# Patient Record
Sex: MALE | Race: WHITE | NOT HISPANIC OR LATINO | URBAN - METROPOLITAN AREA
[De-identification: names, ages, dates, MRNs, and addresses within clinical notes are randomized per-mention and may not be internally consistent; named-entity substitution may affect disease eponyms.]

---

## 2018-03-20 ENCOUNTER — INPATIENT (INPATIENT)
Facility: HOSPITAL | Age: 68
LOS: 0 days | Discharge: ANOTHER HOSPITAL | End: 2018-03-21
Attending: INTERNAL MEDICINE | Admitting: INTERNAL MEDICINE
Payer: MEDICARE

## 2018-03-20 VITALS — OXYGEN SATURATION: 92 % | HEIGHT: 69 IN | WEIGHT: 169.98 LBS

## 2018-03-20 DIAGNOSIS — I25.110 ATHEROSCLEROTIC HEART DISEASE OF NATIVE CORONARY ARTERY WITH UNSTABLE ANGINA PECTORIS: ICD-10-CM

## 2018-03-20 DIAGNOSIS — Z87.891 PERSONAL HISTORY OF NICOTINE DEPENDENCE: ICD-10-CM

## 2018-03-20 DIAGNOSIS — R07.9 CHEST PAIN, UNSPECIFIED: ICD-10-CM

## 2018-03-20 DIAGNOSIS — I11.0 HYPERTENSIVE HEART DISEASE WITH HEART FAILURE: ICD-10-CM

## 2018-03-20 DIAGNOSIS — I50.21 ACUTE SYSTOLIC (CONGESTIVE) HEART FAILURE: ICD-10-CM

## 2018-03-20 DIAGNOSIS — G47.33 OBSTRUCTIVE SLEEP APNEA (ADULT) (PEDIATRIC): ICD-10-CM

## 2018-03-20 DIAGNOSIS — I27.20 PULMONARY HYPERTENSION, UNSPECIFIED: ICD-10-CM

## 2018-03-20 DIAGNOSIS — I34.0 NONRHEUMATIC MITRAL (VALVE) INSUFFICIENCY: ICD-10-CM

## 2018-03-20 LAB
ADD ON TEST-SPECIMEN IN LAB: SIGNIFICANT CHANGE UP
ALBUMIN SERPL ELPH-MCNC: 4 G/DL — SIGNIFICANT CHANGE UP (ref 3.3–5)
ALP SERPL-CCNC: 63 U/L — SIGNIFICANT CHANGE UP (ref 40–120)
ALT FLD-CCNC: 24 U/L — SIGNIFICANT CHANGE UP (ref 12–78)
ANION GAP SERPL CALC-SCNC: 9 MMOL/L — SIGNIFICANT CHANGE UP (ref 5–17)
APPEARANCE UR: CLEAR — SIGNIFICANT CHANGE UP
APTT BLD: 29.4 SEC — SIGNIFICANT CHANGE UP (ref 27.5–37.4)
AST SERPL-CCNC: 21 U/L — SIGNIFICANT CHANGE UP (ref 15–37)
BASOPHILS # BLD AUTO: 0.07 K/UL — SIGNIFICANT CHANGE UP (ref 0–0.2)
BASOPHILS NFR BLD AUTO: 0.9 % — SIGNIFICANT CHANGE UP (ref 0–2)
BILIRUB SERPL-MCNC: 1 MG/DL — SIGNIFICANT CHANGE UP (ref 0.2–1.2)
BILIRUB UR-MCNC: NEGATIVE — SIGNIFICANT CHANGE UP
BUN SERPL-MCNC: 15 MG/DL — SIGNIFICANT CHANGE UP (ref 7–23)
CALCIUM SERPL-MCNC: 8.4 MG/DL — LOW (ref 8.5–10.1)
CHLORIDE SERPL-SCNC: 106 MMOL/L — SIGNIFICANT CHANGE UP (ref 96–108)
CO2 SERPL-SCNC: 24 MMOL/L — SIGNIFICANT CHANGE UP (ref 22–31)
COLOR SPEC: YELLOW — SIGNIFICANT CHANGE UP
CREAT SERPL-MCNC: 1.2 MG/DL — SIGNIFICANT CHANGE UP (ref 0.5–1.3)
D DIMER BLD IA.RAPID-MCNC: 343 NG/ML DDU — HIGH
DIFF PNL FLD: NEGATIVE — SIGNIFICANT CHANGE UP
EOSINOPHIL # BLD AUTO: 0.04 K/UL — SIGNIFICANT CHANGE UP (ref 0–0.5)
EOSINOPHIL NFR BLD AUTO: 0.5 % — SIGNIFICANT CHANGE UP (ref 0–6)
GLUCOSE SERPL-MCNC: 106 MG/DL — HIGH (ref 70–99)
GLUCOSE UR QL: NEGATIVE MG/DL — SIGNIFICANT CHANGE UP
HCT VFR BLD CALC: 46.9 % — SIGNIFICANT CHANGE UP (ref 39–50)
HGB BLD-MCNC: 15.3 G/DL — SIGNIFICANT CHANGE UP (ref 13–17)
IMM GRANULOCYTES NFR BLD AUTO: 0.6 % — SIGNIFICANT CHANGE UP (ref 0–1.5)
INR BLD: 1 RATIO — SIGNIFICANT CHANGE UP (ref 0.88–1.16)
KETONES UR-MCNC: NEGATIVE — SIGNIFICANT CHANGE UP
LACTATE SERPL-SCNC: 1.6 MMOL/L — SIGNIFICANT CHANGE UP (ref 0.7–2)
LEUKOCYTE ESTERASE UR-ACNC: NEGATIVE — SIGNIFICANT CHANGE UP
LYMPHOCYTES # BLD AUTO: 1.68 K/UL — SIGNIFICANT CHANGE UP (ref 1–3.3)
LYMPHOCYTES # BLD AUTO: 20.6 % — SIGNIFICANT CHANGE UP (ref 13–44)
MCHC RBC-ENTMCNC: 31.4 PG — SIGNIFICANT CHANGE UP (ref 27–34)
MCHC RBC-ENTMCNC: 32.6 GM/DL — SIGNIFICANT CHANGE UP (ref 32–36)
MCV RBC AUTO: 96.1 FL — SIGNIFICANT CHANGE UP (ref 80–100)
MONOCYTES # BLD AUTO: 0.93 K/UL — HIGH (ref 0–0.9)
MONOCYTES NFR BLD AUTO: 11.4 % — SIGNIFICANT CHANGE UP (ref 2–14)
NEUTROPHILS # BLD AUTO: 5.38 K/UL — SIGNIFICANT CHANGE UP (ref 1.8–7.4)
NEUTROPHILS NFR BLD AUTO: 66 % — SIGNIFICANT CHANGE UP (ref 43–77)
NITRITE UR-MCNC: NEGATIVE — SIGNIFICANT CHANGE UP
NRBC # BLD: 0 /100 WBCS — SIGNIFICANT CHANGE UP (ref 0–0)
NT-PROBNP SERPL-SCNC: 1665 PG/ML — HIGH (ref 0–125)
PH UR: 6 — SIGNIFICANT CHANGE UP (ref 5–8)
PLATELET # BLD AUTO: 173 K/UL — SIGNIFICANT CHANGE UP (ref 150–400)
POTASSIUM SERPL-MCNC: 4 MMOL/L — SIGNIFICANT CHANGE UP (ref 3.5–5.3)
POTASSIUM SERPL-SCNC: 4 MMOL/L — SIGNIFICANT CHANGE UP (ref 3.5–5.3)
PROT SERPL-MCNC: 6.9 GM/DL — SIGNIFICANT CHANGE UP (ref 6–8.3)
PROT UR-MCNC: NEGATIVE MG/DL — SIGNIFICANT CHANGE UP
PROTHROM AB SERPL-ACNC: 10.8 SEC — SIGNIFICANT CHANGE UP (ref 9.8–12.7)
RAPID RVP RESULT: SIGNIFICANT CHANGE UP
RBC # BLD: 4.88 M/UL — SIGNIFICANT CHANGE UP (ref 4.2–5.8)
RBC # FLD: 12.9 % — SIGNIFICANT CHANGE UP (ref 10.3–14.5)
SODIUM SERPL-SCNC: 139 MMOL/L — SIGNIFICANT CHANGE UP (ref 135–145)
SP GR SPEC: 1.01 — SIGNIFICANT CHANGE UP (ref 1.01–1.02)
TROPONIN I SERPL-MCNC: <0.015 NG/ML — SIGNIFICANT CHANGE UP (ref 0.01–0.04)
TROPONIN I SERPL-MCNC: <0.015 NG/ML — SIGNIFICANT CHANGE UP (ref 0.01–0.04)
UROBILINOGEN FLD QL: NEGATIVE MG/DL — SIGNIFICANT CHANGE UP
WBC # BLD: 8.15 K/UL — SIGNIFICANT CHANGE UP (ref 3.8–10.5)
WBC # FLD AUTO: 8.15 K/UL — SIGNIFICANT CHANGE UP (ref 3.8–10.5)

## 2018-03-20 PROCEDURE — 99285 EMERGENCY DEPT VISIT HI MDM: CPT

## 2018-03-20 PROCEDURE — 93010 ELECTROCARDIOGRAM REPORT: CPT

## 2018-03-20 PROCEDURE — 71045 X-RAY EXAM CHEST 1 VIEW: CPT | Mod: 26

## 2018-03-20 RX ORDER — TIOTROPIUM BROMIDE 18 UG/1
1 CAPSULE ORAL; RESPIRATORY (INHALATION) DAILY
Qty: 0 | Refills: 0 | Status: DISCONTINUED | OUTPATIENT
Start: 2018-03-20 | End: 2018-03-20

## 2018-03-20 RX ORDER — IPRATROPIUM/ALBUTEROL SULFATE 18-103MCG
3 AEROSOL WITH ADAPTER (GRAM) INHALATION
Qty: 0 | Refills: 0 | Status: DISCONTINUED | OUTPATIENT
Start: 2018-03-20 | End: 2018-03-21

## 2018-03-20 RX ORDER — ACETAMINOPHEN 500 MG
650 TABLET ORAL EVERY 6 HOURS
Qty: 0 | Refills: 0 | Status: DISCONTINUED | OUTPATIENT
Start: 2018-03-20 | End: 2018-03-21

## 2018-03-20 RX ORDER — FUROSEMIDE 40 MG
20 TABLET ORAL ONCE
Qty: 0 | Refills: 0 | Status: COMPLETED | OUTPATIENT
Start: 2018-03-20 | End: 2018-03-20

## 2018-03-20 RX ORDER — ATORVASTATIN CALCIUM 80 MG/1
40 TABLET, FILM COATED ORAL AT BEDTIME
Qty: 0 | Refills: 0 | Status: DISCONTINUED | OUTPATIENT
Start: 2018-03-20 | End: 2018-03-21

## 2018-03-20 RX ORDER — ALBUTEROL 90 UG/1
1 AEROSOL, METERED ORAL EVERY 4 HOURS
Qty: 0 | Refills: 0 | Status: DISCONTINUED | OUTPATIENT
Start: 2018-03-20 | End: 2018-03-20

## 2018-03-20 RX ORDER — ALBUTEROL 90 UG/1
2.5 AEROSOL, METERED ORAL EVERY 4 HOURS
Qty: 0 | Refills: 0 | Status: DISCONTINUED | OUTPATIENT
Start: 2018-03-20 | End: 2018-03-21

## 2018-03-20 RX ORDER — ONDANSETRON 8 MG/1
4 TABLET, FILM COATED ORAL EVERY 6 HOURS
Qty: 0 | Refills: 0 | Status: DISCONTINUED | OUTPATIENT
Start: 2018-03-20 | End: 2018-03-21

## 2018-03-20 RX ORDER — IPRATROPIUM/ALBUTEROL SULFATE 18-103MCG
3 AEROSOL WITH ADAPTER (GRAM) INHALATION EVERY 6 HOURS
Qty: 0 | Refills: 0 | Status: DISCONTINUED | OUTPATIENT
Start: 2018-03-20 | End: 2018-03-20

## 2018-03-20 RX ORDER — ASPIRIN/CALCIUM CARB/MAGNESIUM 324 MG
1 TABLET ORAL
Qty: 0 | Refills: 0 | COMMUNITY

## 2018-03-20 RX ORDER — ASPIRIN/CALCIUM CARB/MAGNESIUM 324 MG
325 TABLET ORAL DAILY
Qty: 0 | Refills: 0 | Status: DISCONTINUED | OUTPATIENT
Start: 2018-03-20 | End: 2018-03-21

## 2018-03-20 RX ADMIN — Medication 3 MILLILITER(S): at 10:27

## 2018-03-20 RX ADMIN — Medication 20 MILLIGRAM(S): at 13:58

## 2018-03-20 RX ADMIN — Medication 10 MILLIGRAM(S): at 13:58

## 2018-03-20 RX ADMIN — Medication 325 MILLIGRAM(S): at 13:57

## 2018-03-20 RX ADMIN — ATORVASTATIN CALCIUM 40 MILLIGRAM(S): 80 TABLET, FILM COATED ORAL at 22:53

## 2018-03-20 RX ADMIN — Medication 125 MILLIGRAM(S): at 10:53

## 2018-03-20 NOTE — ED ADULT TRIAGE NOTE - CHIEF COMPLAINT QUOTE
Pt complains of difficulty breathing, "I feel like I can't catch my breath," starting yesterday morning, worsening today.  Pulse ox 92% on room air on arrival.

## 2018-03-20 NOTE — H&P ADULT - ASSESSMENT
#Dyspnea on exertion  Admit to tele  CEx3  Card evfreddy Puga  Last ECHO as per DR Puga- EF 44%  ?New onset CHF- will give Lasix 20mg IV one dose as pt is diuretic-naive  Aspirin, Lipitor  NPO p MN for card cath in am  Pulm verna Cisneros- as per pt had recent PFT's    #Dispo- inpatient admit

## 2018-03-20 NOTE — H&P ADULT - HISTORY OF PRESENT ILLNESS
69yo/M with PMH HTN, hyperlipidemia took himself off all the meds while started on "plant diet" presented for evaluation of SOB. He states that for the last couple of days he was getting worsening dyspnea on exertion and today in am he woke up as he could not breathe. Denies chest pain. NO fever, no recent sick contacts.   Patient had positive stress test 2 years ago during clearance for cystoscopy, was essentially asymptomatic and did not have card cath. HIs last ECHO had EF 44%

## 2018-03-20 NOTE — H&P ADULT - NSHPPHYSICALEXAM_GEN_ALL_CORE
Vital Signs Last 24 Hrs  T(C): 37 (20 Mar 2018 12:53), Max: 37 (20 Mar 2018 12:53)  T(F): 98.6 (20 Mar 2018 12:53), Max: 98.6 (20 Mar 2018 12:53)  HR: 114 (20 Mar 2018 12:53) (88 - 119)  BP: 123/83 (20 Mar 2018 12:53) (123/83 - 152/99)  BP(mean): --  RR: 17 (20 Mar 2018 12:53) (16 - 19)  SpO2: 95% (20 Mar 2018 12:53) (92% - 98%)

## 2018-03-20 NOTE — H&P ADULT - NSHPLABSRESULTS_GEN_ALL_CORE
15.3   8.15  )-----------( 173      ( 20 Mar 2018 08:16 )             46.9     20 Mar 2018 08:16    139    |  106    |  15     ----------------------------<  106    4.0     |  24     |  1.20     Ca    8.4        20 Mar 2018 08:16    TPro  6.9    /  Alb  4.0    /  TBili  1.0    /  DBili  x      /  AST  21     /  ALT  24     /  AlkPhos  63     20 Mar 2018 08:16    LIVER FUNCTIONS - ( 20 Mar 2018 08:16 )  Alb: 4.0 g/dL / Pro: 6.9 gm/dL / ALK PHOS: 63 U/L / ALT: 24 U/L / AST: 21 U/L / GGT: x         PT/INR - ( 20 Mar 2018 08:16 )   PT: 10.8 sec;   INR: 1.00 ratio       PTT - ( 20 Mar 2018 08:16 )  PTT:29.4 sec    CARDIAC MARKERS ( 20 Mar 2018 10:54 )  <0.015 ng/mL / x     / x     / x     / x      CARDIAC MARKERS ( 20 Mar 2018 08:16 )  <0.015 ng/mL / x     / x     / x     / x        Urinalysis Basic - ( 20 Mar 2018 08:16 )  Color: Yellow / Appearance: Clear / S.015 / pH: x  Gluc: x / Ketone: Negative  / Bili: Negative / Urobili: Negative mg/dL   Blood: x / Protein: Negative mg/dL / Nitrite: Negative   Leuk Esterase: Negative / RBC: x / WBC x   Sq Epi: x / Non Sq Epi: x / Bacteria: x

## 2018-03-20 NOTE — ED PROVIDER NOTE - OBJECTIVE STATEMENT
67yo M with hx of htn, hyperlipidemia, BPH presents with sob the last few days.  Pt has been busy moving with his wife.  He denies any cp, pleuritic cp, fevers, uri sx, myalgias.  No risk factors for PE.  Pt has stopped all his meds due to side effects.  His pcp is not aware.    PCP Ad[place].

## 2018-03-20 NOTE — ED PROVIDER NOTE - PROGRESS NOTE DETAILS
Spoke to hospitalist who recommends ddimer.  Ddimer slightly elevated.  Very low suspicion for PE and less than 700 for age adjusted ddimer.  Dr. Cha agrees with holding off on further imaging at this time.  She will consult cards.

## 2018-03-20 NOTE — PROVIDER CONTACT NOTE (OTHER) - SITUATION
pt received at 12:00. Labs reviewed, elevated D-dimer noted. MD Chappell made aware, MD Cha also made aware. Awaiting orders at this time. Will continue to monitor.

## 2018-03-20 NOTE — ED ADULT NURSE NOTE - OBJECTIVE STATEMENT
Pt with sob upon exertion and waking up in a.m., onset "couple days ago".  Pt also c/o dry cough last week.  Denies fevers/chills

## 2018-03-21 ENCOUNTER — TRANSCRIPTION ENCOUNTER (OUTPATIENT)
Age: 68
End: 2018-03-21

## 2018-03-21 VITALS
OXYGEN SATURATION: 100 % | DIASTOLIC BLOOD PRESSURE: 74 MMHG | RESPIRATION RATE: 16 BRPM | SYSTOLIC BLOOD PRESSURE: 105 MMHG | HEART RATE: 96 BPM | TEMPERATURE: 97 F

## 2018-03-21 LAB
ANION GAP SERPL CALC-SCNC: 9 MMOL/L — SIGNIFICANT CHANGE UP (ref 5–17)
BUN SERPL-MCNC: 15 MG/DL — SIGNIFICANT CHANGE UP (ref 7–23)
CALCIUM SERPL-MCNC: 8.2 MG/DL — LOW (ref 8.5–10.1)
CHLORIDE SERPL-SCNC: 106 MMOL/L — SIGNIFICANT CHANGE UP (ref 96–108)
CO2 SERPL-SCNC: 22 MMOL/L — SIGNIFICANT CHANGE UP (ref 22–31)
CREAT SERPL-MCNC: 0.94 MG/DL — SIGNIFICANT CHANGE UP (ref 0.5–1.3)
CULTURE RESULTS: NO GROWTH — SIGNIFICANT CHANGE UP
GLUCOSE SERPL-MCNC: 110 MG/DL — HIGH (ref 70–99)
HCT VFR BLD CALC: 42.5 % — SIGNIFICANT CHANGE UP (ref 39–50)
HGB BLD-MCNC: 14.1 G/DL — SIGNIFICANT CHANGE UP (ref 13–17)
MCHC RBC-ENTMCNC: 31 PG — SIGNIFICANT CHANGE UP (ref 27–34)
MCHC RBC-ENTMCNC: 33.2 GM/DL — SIGNIFICANT CHANGE UP (ref 32–36)
MCV RBC AUTO: 93.4 FL — SIGNIFICANT CHANGE UP (ref 80–100)
NRBC # BLD: 0 /100 WBCS — SIGNIFICANT CHANGE UP (ref 0–0)
PLATELET # BLD AUTO: 184 K/UL — SIGNIFICANT CHANGE UP (ref 150–400)
POTASSIUM SERPL-MCNC: 4.2 MMOL/L — SIGNIFICANT CHANGE UP (ref 3.5–5.3)
POTASSIUM SERPL-SCNC: 4.2 MMOL/L — SIGNIFICANT CHANGE UP (ref 3.5–5.3)
RBC # BLD: 4.55 M/UL — SIGNIFICANT CHANGE UP (ref 4.2–5.8)
RBC # FLD: 13 % — SIGNIFICANT CHANGE UP (ref 10.3–14.5)
SODIUM SERPL-SCNC: 137 MMOL/L — SIGNIFICANT CHANGE UP (ref 135–145)
SPECIMEN SOURCE: SIGNIFICANT CHANGE UP
WBC # BLD: 12.26 K/UL — HIGH (ref 3.8–10.5)
WBC # FLD AUTO: 12.26 K/UL — HIGH (ref 3.8–10.5)

## 2018-03-21 PROCEDURE — 93312 ECHO TRANSESOPHAGEAL: CPT | Mod: 26

## 2018-03-21 PROCEDURE — 93306 TTE W/DOPPLER COMPLETE: CPT | Mod: 26

## 2018-03-21 RX ORDER — ALBUTEROL 90 UG/1
0 AEROSOL, METERED ORAL
Qty: 0 | Refills: 0 | COMMUNITY
Start: 2018-03-21

## 2018-03-21 RX ORDER — ATORVASTATIN CALCIUM 80 MG/1
1 TABLET, FILM COATED ORAL
Qty: 0 | Refills: 0 | COMMUNITY
Start: 2018-03-21

## 2018-03-21 RX ORDER — ATORVASTATIN CALCIUM 80 MG/1
1 TABLET, FILM COATED ORAL
Qty: 0 | Refills: 0 | COMMUNITY

## 2018-03-21 RX ORDER — IPRATROPIUM/ALBUTEROL SULFATE 18-103MCG
3 AEROSOL WITH ADAPTER (GRAM) INHALATION
Qty: 0 | Refills: 0 | COMMUNITY
Start: 2018-03-21

## 2018-03-21 RX ORDER — ACETAMINOPHEN 500 MG
2 TABLET ORAL
Qty: 0 | Refills: 0 | COMMUNITY
Start: 2018-03-21

## 2018-03-21 RX ADMIN — Medication 10 MILLIGRAM(S): at 05:24

## 2018-03-21 RX ADMIN — Medication 325 MILLIGRAM(S): at 08:40

## 2018-03-21 NOTE — PROGRESS NOTE ADULT - SUBJECTIVE AND OBJECTIVE BOX
69yo/M with PMH HTN, hyperlipidemia took himself off all the meds while started on "plant diet" presented for evaluation of SOB. He states that for the last couple of days he was getting worsening dyspnea on exertion and today in am he woke up as he could not breathe. Denies chest pain. NO fever, no recent sick contacts.   Patient had positive stress test 2 years ago during clearance for cystoscopy, was essentially asymptomatic and did not have card cath. HIs last ECHO had EF 44%        18: Patient seen and examined. He denies any sob now. cardiac cath today. Discussed with patient and wife at bed side regarding management and d/c plan. Discussed with Dr Puga.           Vital Signs Last 24 Hrs  T(C): 36.6 (21 Mar 2018 05:20), Max: 37 (20 Mar 2018 12:53)  T(F): 97.8 (21 Mar 2018 05:20), Max: 98.6 (20 Mar 2018 12:53)  HR: 98 (21 Mar 2018 09:41) (96 - 117)  BP: 121/76 (21 Mar 2018 09:41) (121/76 - 171/76)  BP(mean): --  RR: 16 (21 Mar 2018 09:41) (16 - 19)  SpO2: 98% (21 Mar 2018 09:41) (95% - 98%)      Physical Exam:  · Constitutional	Well-developed, well nourished	  · Neck	No bruits; no thyromegaly or nodules	  · Back	No deformity or limitation of movement	  · Respiratory	detailed exam	  · Respiratory Details	rales	  · Cardiovascular	Regular rate & rhythm, normal S1, S2; no murmurs, gallops or rubs; no S3, S4	  · Gastrointestinal	Soft, non-tender, no hepatosplenomegaly, normal bowel sounds	  · Extremities	No cyanosis, clubbing or edema	  · Neurological	Alert & oriented; no sensory, motor or coordination deficits, normal reflexes	  · Skin	No lesions; no rash	  · Musculoskeletal	No joint pain, swelling or deformity; no limitation of movement	          Labs:                              14.1   12.26 )-----------( 184      ( 21 Mar 2018 05:29 )             42.5     21 Mar 2018 05:29    137    |  106    |  15     ----------------------------<  110    4.2     |  22     |  0.94     Ca    8.2        21 Mar 2018 05:29    TPro  6.9    /  Alb  4.0    /  TBili  1.0    /  DBili  x      /  AST  21     /  ALT  24     /  AlkPhos  63     20 Mar 2018 08:16    LIVER FUNCTIONS - ( 20 Mar 2018 08:16 )  Alb: 4.0 g/dL / Pro: 6.9 gm/dL / ALK PHOS: 63 U/L / ALT: 24 U/L / AST: 21 U/L / GGT: x           PT/INR - ( 20 Mar 2018 08:16 )   PT: 10.8 sec;   INR: 1.00 ratio         PTT - ( 20 Mar 2018 08:16 )  PTT:29.4 sec  CAPILLARY BLOOD GLUCOSE        CARDIAC MARKERS ( 20 Mar 2018 10:54 )  <0.015 ng/mL / x     / x     / x     / x      CARDIAC MARKERS ( 20 Mar 2018 08:16 )  <0.015 ng/mL / x     / x     / x     / x          Urinalysis Basic - ( 20 Mar 2018 08:16 )    Color: Yellow / Appearance: Clear / S.015 / pH: x  Gluc: x / Ketone: Negative  / Bili: Negative / Urobili: Negative mg/dL   Blood: x / Protein: Negative mg/dL / Nitrite: Negative   Leuk Esterase: Negative / RBC: x / WBC x   Sq Epi: x / Non Sq Epi: x / Bacteria: x            MEDICATIONS:    ALBUTerol/ipratropium for Nebulization 3 milliLiter(s) Nebulizer every 15 minutes  aspirin 325 milliGRAM(s) Oral daily  atorvastatin 40 milliGRAM(s) Oral at bedtime  enalapril 10 milliGRAM(s) Oral daily    MEDICATIONS  (PRN):  acetaminophen   Tablet 650 milliGRAM(s) Oral every 6 hours PRN For Temp greater than 38 C (100.4 F)  acetaminophen   Tablet. 650 milliGRAM(s) Oral every 6 hours PRN Mild Pain (1 - 3)  ALBUTerol    0.083% 2.5 milliGRAM(s) Nebulizer every 4 hours PRN Shortness of Breath and/or Wheezing  aluminum hydroxide/magnesium hydroxide/simethicone Suspension 30 milliLiter(s) Oral every 4 hours PRN Dyspepsia  ondansetron Injectable 4 milliGRAM(s) IV Push every 6 hours PRN Nausea          Assessment and Plan:   Assessment:  · Assessment		  #Dyspnea on exertion  Ruled out ACS  Card eval DR Puga  Last ECHO as per DR Puga- EF 44%  R/O New onset CHF- received Lasix 20mg IV one dose in ED yesterday  Hold further lasix today due to cath  Aspirin, Lipitor  Follow cath report  Pulm eval DR Cisneros appreciated  CT chest tomorrow am.     # HTN-stable  Continue vasotec.    # Hyperlipidemia  Continue lipitor.

## 2018-03-21 NOTE — CHART NOTE - NSCHARTNOTEFT_GEN_A_CORE
s/p [facility]    Patient feels well.  Denies chest pain, shortness of breath, dizziness or palpitations at this time  A+Ox3  Right groin procedure site CDI.  no bleeding, no hematoma, site soft, non tender, positive pedal pulses bilaterally      HPI:  69yo/M with PMH HTN, hyperlipidemia took himself off all the meds while started on "plant diet" presented for evaluation of SOB. He states that for the last couple of days he was getting worsening dyspnea on exertion and today in am he woke up as he could not breathe. Denies chest pain. NO fever, no recent sick contacts.   Patient had positive stress test 2 years ago during clearance for cystoscopy, was essentially asymptomatic and did not have card cath. HIs last ECHO had EF 44% (20 Mar 2018 12:49)    now s/p [facility] revealing     -CHRIS post cath  -vital signs, diet and activity per post procedure orders  -ambulate ad radha post bedrest  -plan of care discussed with patient and MD   -post procedure instructions reviewed  - s/p [facility]    Patient feels well.  Denies chest pain, shortness of breath, dizziness or palpitations at this time  A+Ox3  Right groin procedure site CDI.  no bleeding, no hematoma, site soft, non tender, positive pedal pulses bilaterally      HPI:  67yo/M with PMH HTN, hyperlipidemia took himself off all the meds while started on "plant diet" presented for evaluation of SOB. He states that for the last couple of days he was getting worsening dyspnea on exertion and today in am he woke up as he could not breathe. Denies chest pain. No fever, no recent sick contacts.   Patient had positive stress test 2 years ago during clearance for cystoscopy, was essentially asymptomatic and did not have card cath. HIs last ECHO had EF 44% (20 Mar 2018 12:49)    now s/p [facility] revealing LAD stenosis and moderate to severe MR    -CHRIS post cath  -vital signs, diet and activity per post procedure orders  -ambulate ad radha post bedrest  -plan of care discussed with patient and MD   -post procedure instructions reviewed

## 2018-03-21 NOTE — CONSULT NOTE ADULT - SUBJECTIVE AND OBJECTIVE BOX
Patient is a 68y old  Male who presents with a chief complaint of dyspnea on exertion/SOB (20 Mar 2018 12:49)      HPI:  67yo/M with PMH HTN, hyperlipidemia took himself off all the meds while started on "plant diet" presented for evaluation of SOB. He states that for the last couple of days he was getting worsening dyspnea on exertion and today in am he woke up as he could not breathe. Denies chest pain. NO fever, no recent sick contacts.   Patient had positive stress test 2 years ago during clearance for cystoscopy, was essentially asymptomatic and did not have card cath. HIs last ECHO had EF 44% (20 Mar 2018 12:49)    prev seen 2016 for JUANJO and was treated with CPAP 8 cm, effective and was able to lose wt and now not using it regularly  ex smoker 1 ppd x 30 yrs, and quit smoking 8 yrs ago  no active cough, wheeze or sputum production  no pleuritic pain  PAST MEDICAL & SURGICAL HISTORY:  Hyperlipidemia, unspecified hyperlipidemia type  Hypertension, unspecified type  No significant past surgical history      PREVIOUS DIAGNOSTIC TESTING:      MEDICATIONS  (STANDING):  ALBUTerol/ipratropium for Nebulization 3 milliLiter(s) Nebulizer every 15 minutes  aspirin 325 milliGRAM(s) Oral daily  atorvastatin 40 milliGRAM(s) Oral at bedtime  enalapril 10 milliGRAM(s) Oral daily    MEDICATIONS  (PRN):  acetaminophen   Tablet 650 milliGRAM(s) Oral every 6 hours PRN For Temp greater than 38 C (100.4 F)  acetaminophen   Tablet. 650 milliGRAM(s) Oral every 6 hours PRN Mild Pain (1 - 3)  ALBUTerol    0.083% 2.5 milliGRAM(s) Nebulizer every 4 hours PRN Shortness of Breath and/or Wheezing  aluminum hydroxide/magnesium hydroxide/simethicone Suspension 30 milliLiter(s) Oral every 4 hours PRN Dyspepsia  ondansetron Injectable 4 milliGRAM(s) IV Push every 6 hours PRN Nausea      FAMILY HISTORY:  No pertinent family history in first degree relatives      SOCIAL HISTORY:  ***    REVIEW OF SYSTEM:  Pertinent items are noted in HPI.  Constitutional negative for chills, fevers, sweats and weight loss  throat, and face:  negative for epistaxis,pos  nasal congestion, sore throat and   tinnitus  Respiratory: negative for cough, pos dyspnea on exertion, pleuritic chest pain  and wheezing  Cardiovascular:  negative for chest pain, dyspnea and palpitations  Gastrointestinal: negative for abdominal pain, diarrhea, nausea and vomiting  Genitourinary: negative for dysuria, frequency and urinary incontinence  Skin:  negative for redness, rash, pruritus, swelling, dryness and   fissures      Vital Signs Last 24 Hrs  T(C): 36.6 (21 Mar 2018 05:20), Max: 37 (20 Mar 2018 12:53)  T(F): 97.8 (21 Mar 2018 05:20), Max: 98.6 (20 Mar 2018 12:53)  HR: 96 (21 Mar 2018 05:20) (96 - 119)  BP: 126/73 (21 Mar 2018 05:20) (123/83 - 145/70)  BP(mean): --  RR: 17 (21 Mar 2018 05:20) (16 - 18)  SpO2: 95% (21 Mar 2018 05:20) (95% - 98%)    I&O's Summary    PHYSICAL EXAM  General Appearance: cooperative, no acute distress,   HEENT: PERRL, conjunctiva clear, EOM's intact, non injected pharynx, no exudate, TM   normal  Neck: Supple, , no adenopathy, thyroid: not enlarged, no carotid bruit or JVD  Back: Symmetric, no  tenderness,no soft tissue tenderness  Lungs: Clear to auscultation bilateral,no adventitious breath sounds, normal   expiratory phase  Heart: Regular rate and rhythm, S1, S2 normal, no murmur, rub or gallop  Abdomen: Soft, non-tender, bowel sounds active , no hepatosplenomegaly  Extremities: no cyanosis or edema, no joint swelling  Skin: Skin color, texture normal, no rashes   Neurologic: Alert and oriented X3 , cranial nerves intact, sensory and motor normal,    ECG:    LABS:                          14.1   12. )-----------( 184      ( 21 Mar 2018 05:29 )             42.5     03-21    137  |  106  |  15  ----------------------------<  110<H>  4.2   |  22  |  0.94    Ca    8.2<L>      21 Mar 2018 05:29    TPro  6.9  /  Alb  4.0  /  TBili  1.0  /  DBili  x   /  AST  21  /  ALT  24  /  AlkPhos  63  03-20    CARDIAC MARKERS ( 20 Mar 2018 10:54 )  <0.015 ng/mL / x     / x     / x     / x      CARDIAC MARKERS ( 20 Mar 2018 08:16 )  <0.015 ng/mL / x     / x     / x     / x            Pro BNP  1665  @ 08:16  D Dimer  343  @ 08:16    PT/INR - ( 20 Mar 2018 08:16 )   PT: 10.8 sec;   INR: 1.00 ratio         PTT - ( 20 Mar 2018 08:16 )  PTT:29.4 sec  Urinalysis Basic - ( 20 Mar 2018 08:16 )    Color: Yellow / Appearance: Clear / S.015 / pH: x  Gluc: x / Ketone: Negative  / Bili: Negative / Urobili: Negative mg/dL   Blood: x / Protein: Negative mg/dL / Nitrite: Negative   Leuk Esterase: Negative / RBC: x / WBC x   Sq Epi: x / Non Sq Epi: x / Bacteria: x      < from: Xray Chest 1 View- PORTABLE-Routine (18 @ 09:45) >    PROCEDURE DATE:  2018          INTERPRETATION:  AP portable study of the chest, dated 3/20/18.    COMPARISON: 12/1/15.    CLINICAL INFORMATION: SOB.  FINDINGS:    The airway is midline.    Interval development of mild bilateral pulmonary vascular congestion and   a small bilateral pleural effusions.  Cardiomegaly.  The bones are normal.     IMPRESSION:  Findings are likely indicative of interval development of congestive   heart failure for which clinical correlation is recommended.    < end of copied text >        RADIOLOGY & ADDITIONAL STUDIES:

## 2018-03-21 NOTE — CONSULT NOTE ADULT - SUBJECTIVE AND OBJECTIVE BOX
69yo/M with PMH HTN, hyperlipidemia took himself off all the meds while started on "plant diet" presented for evaluation of SOB. He states that for the last couple of days he was getting worsening dyspnea on exertion and today in am he woke up as he could not breathe. Denies chest pain. NO fever, no recent sick contacts.   Patient had positive stress test 2 years ago during clearance for cystoscopy, was essentially asymptomatic and did not have card cath. HIs last ECHO had EF 44% (20 Mar 2018 12:49)    3/21-  feeling okay today.  having alot of PVCs  have decided to proceed with cardiac cath while in the hospital.      PAST MEDICAL & SURGICAL HISTORY:  Hyperlipidemia, unspecified hyperlipidemia type  Hypertension, unspecified type  No significant past surgical history    MEDICATIONS  (STANDING):  ALBUTerol/ipratropium for Nebulization 3 milliLiter(s) Nebulizer every 15 minutes  aspirin 325 milliGRAM(s) Oral daily  atorvastatin 40 milliGRAM(s) Oral at bedtime  enalapril 10 milliGRAM(s) Oral daily    MEDICATIONS  (PRN):  acetaminophen   Tablet 650 milliGRAM(s) Oral every 6 hours PRN For Temp greater than 38 C (100.4 F)  acetaminophen   Tablet. 650 milliGRAM(s) Oral every 6 hours PRN Mild Pain (1 - 3)  ALBUTerol    0.083% 2.5 milliGRAM(s) Nebulizer every 4 hours PRN Shortness of Breath and/or Wheezing  aluminum hydroxide/magnesium hydroxide/simethicone Suspension 30 milliLiter(s) Oral every 4 hours PRN Dyspepsia  ondansetron Injectable 4 milliGRAM(s) IV Push every 6 hours PRN Nausea    Allergies    No Known Allergies    Intolerances      FAMILY HISTORY:  No pertinent family history in first degree relatives        REVIEW OF SYSTEMS:    CONSTITUTIONAL: No weakness, fevers or chills  EYES/ENT: No visual changes;  No vertigo or throat pain   NECK: No pain or stiffness  RESPIRATORY: No cough, wheezing, hemoptysis; No shortness of breath  CARDIOVASCULAR: No chest pain or palpitations  GASTROINTESTINAL: No abdominal or epigastric pain. No nausea, vomiting, or hematemesis; No diarrhea or constipation. No melena or hematochezia.  GENITOURINARY: No dysuria, frequency or hematuria  NEUROLOGICAL: No numbness or weakness  SKIN: No itching, burning, rashes, or lesions   All other review of systems is negative unless indicated above      PHYSICAL EXAM:  Daily     Daily   Vital Signs Last 24 Hrs  T(C): 36.6 (21 Mar 2018 05:20), Max: 37 (20 Mar 2018 12:53)  T(F): 97.8 (21 Mar 2018 05:20), Max: 98.6 (20 Mar 2018 12:53)  HR: 96 (21 Mar 2018 05:20) (88 - 119)  BP: 126/73 (21 Mar 2018 05:20) (123/83 - 145/70)  BP(mean): --  RR: 17 (21 Mar 2018 05:20) (16 - 19)  SpO2: 95% (21 Mar 2018 05:20) (95% - 98%)    Constitutional: NAD, awake and alert, well-developed  HEENT: PERR, EOMI, Normal Hearing, MMM  Neck: Soft and supple, No LAD, No JVD  Respiratory: Breath sounds are clear bilaterally, No wheezing, rales or rhonchi  Cardiovascular: S1 and S2, regular rate and rhythm, no Murmurs, gallops or rubs  Gastrointestinal: Bowel Sounds present, soft, nontender, nondistended, no guarding, no rebound  Extremities: No peripheral edema  Vascular: 2+ peripheral pulses  Neurological: A/O x 3, no focal deficits  Musculoskeletal: 5/5 strength b/l upper and lower extremities  Skin: No rashes    LABS: All Labs Reviewed:                        14.1   12.26 )-----------( 184      ( 21 Mar 2018 05:29 )             42.5     03-21    137  |  106  |  15  ----------------------------<  110<H>  4.2   |  22  |  0.94    Ca    8.2<L>      21 Mar 2018 05:29    TPro  6.9  /  Alb  4.0  /  TBili  1.0  /  DBili  x   /  AST  21  /  ALT  24  /  AlkPhos  63  03-20    PT/INR - ( 20 Mar 2018 08:16 )   PT: 10.8 sec;   INR: 1.00 ratio         PTT - ( 20 Mar 2018 08:16 )  PTT:29.4 sec  CARDIAC MARKERS ( 20 Mar 2018 10:54 )  <0.015 ng/mL / x     / x     / x     / x      CARDIAC MARKERS ( 20 Mar 2018 08:16 )  <0.015 ng/mL / x     / x     / x     / x          Blood Culture:     RADIOLOGY: < from: Xray Chest 1 View- PORTABLE-Routine (03.20.18 @ 09:45) >  EXAM:  XR CHEST PORTABLE ROUTINE 1V                            PROCEDURE DATE:  03/20/2018          INTERPRETATION:  AP portable study of the chest, dated 3/20/18.    COMPARISON: 12/1/15.    CLINICAL INFORMATION: SOB.  FINDINGS:    The airway is midline.    Interval development of mild bilateral pulmonary vascular congestion and   a small bilateral pleural effusions.  Cardiomegaly.  The bones are normal.     IMPRESSION:  Findings are likely indicative of interval development of congestive   heart failure for which clinical correlation is recommended.      EKG:< from: 12 Lead ECG (03.20.18 @ 07:47) >  Ventricular Rate 121 BPM    Atrial Rate 121 BPM    P-R Interval 158 ms    QRS Duration 84 ms    Q-T Interval 334 ms    QTC Calculation(Bezet) 474 ms    P Axis 68 degrees    R Axis 51 degrees    T Axis 33 degrees    Diagnosis Line Sinus tachycardia with Premature atrial complexes  Possible Left atrial enlargement  Moderate voltage criteria for LVH, may be normal variant  Borderline ECG  Confirmed by ESHA GTUIERREZ MD ([id number]) on 3/20/2018 5:12:01 PM      CARDIOLOGY TESTING:

## 2018-03-21 NOTE — DISCHARGE NOTE ADULT - PATIENT PORTAL LINK FT
You can access the [facility] Patient Portal, offered by [facility], by registering with the following website: [email or web address]

## 2018-03-21 NOTE — DISCHARGE NOTE ADULT - CARE PROVIDER_API CALL
Deanna Puga), Cardiovascular Disease; Internal Medicine  [address] [place] [address]  Phone: [phone]  Fax: [phone]

## 2018-03-21 NOTE — DISCHARGE NOTE ADULT - CARE PLAN
Principal Discharge DX:	Mitral valve disorder  Goal:	Symptoms free  Assessment and plan of treatment:	Transfer to [place] for possible CABG  Secondary Diagnosis:	Dyspnea, unspecified type  Goal:	symptoms free  Assessment and plan of treatment:	Transfer to [place] for valvular surgery

## 2018-03-21 NOTE — DISCHARGE NOTE ADULT - PLAN OF CARE
Symptoms free Transfer to [place] for possible CABG symptoms free Transfer to [place] for valvular surgery

## 2018-03-21 NOTE — PACU DISCHARGE NOTE - COMMENTS
Report given to Danish, receiving RN on [facility].  Pt. replaced on portable cardiac monitor; reading confirmed w/ Kristy, [facility] telemetry monitor tech.  Pt. transferred to inpt. room on [facility] on cardiac monitor via stretcher.  Telephone report given to ADELIA Romero with Dr. Maldonado, receiving MD at [facility] who verb. intent to  pt. either today or tomorrow as bed request has been submitted.

## 2018-03-21 NOTE — PROCEDURAL SAFETY CHECKLIST WITH OR WITHOUT SEDATION - NSPOSTCOMMENTFT_GEN_ALL_CORE
Pt. given viscous lidocaine, swish and swallow, by Dr. Aguirre, at 1320; probe inserted by Dr. Aguirre without difficulty at 1327; Pt. given viscous lidocaine, swish and swallow, by Dr. Aguirre, at 1320; probe inserted by Dr. Aguirre without difficulty at 1327; bubble study completed at 1341; probe removed at 1348; [email or web address] well.

## 2018-03-21 NOTE — CONSULT NOTE ADULT - ASSESSMENT
67yo/M with PMH HTN, hyperlipidemia took himself off all the meds while started on "plant diet" presented for evaluation of SOB. He states that for the last couple of days he was getting worsening dyspnea on exertion and today in am he woke up as he could not breathe. Denies chest pain. NO fever, no recent sick contacts.   Patient had positive stress test 2 years ago during clearance for cystoscopy, was essentially asymptomatic and did not have card cath. His last ECHO had EF 44% (20 Mar 2018 12:49)    3/21-  schedule cardiac cath today with Dr Farrar  Echocardiogram.

## 2018-03-21 NOTE — DISCHARGE NOTE ADULT - MEDICATION SUMMARY - MEDICATIONS TO TAKE
I will START or STAY ON the medications listed below when I get home from the hospital:    aspirin 81 mg oral tablet  -- 1 tab(s) by mouth once a day  -- Indication: For CAD    acetaminophen 325 mg oral tablet  -- 2 tab(s) by mouth every 6 hours, As needed, For Temp greater than 38 C (100.4 F)  -- Indication: For Fever    acetaminophen 325 mg oral tablet  -- 2 tab(s) by mouth every 6 hours, As needed, Mild Pain (1 - 3)  -- Indication: For pain    enalapril 10 mg oral tablet  -- 1 tab(s) by mouth once a day  -- Indication: For HTN    aluminum hydroxide-magnesium hydroxide 200 mg-200 mg/5 mL oral suspension  -- 30 milliliter(s) by mouth every 4 hours, As needed, Dyspepsia  -- Indication: For Dyspepsia    atorvastatin 40 mg oral tablet  -- 1 tab(s) by mouth once a day (at bedtime)  -- Indication: For Hyperlipidemia, unspecified hyperlipidemia type    albuterol 2.5 mg/3 mL (0.083%) inhalation solution  --  inhaled   -- Indication: For DYPSNEA/HYPOXIA    ipratropium-albuterol 0.5 mg-2.5 mg/3 mLinhalation solution  -- 3 milliliter(s) inhaled every 15 minutes  -- Indication: For DYPSNEA/HYPOXIA

## 2018-03-21 NOTE — CONSULT NOTE ADULT - ASSESSMENT
67yo/M with PMH HTN, hyperlipidemia took himself off all the meds while started on "plant diet" presented for evaluation of SOB. He states that for the last couple of days he was getting worsening dyspnea on exertion and today in am he woke up as he could not breathe. Denies chest pain. NO fever, no recent sick contacts.   Patient had positive stress test 2 years ago during clearance for cystoscopy, was essentially asymptomatic and did not have card cath. HIs last ECHO had EF 44%  Recent MARCELINO likley due too CHF  no active bronchospasm  needs re eval for COPD / Emphysema with 30-40 pk yr smoking hx  eval for CAD / pulm HTN  Improved resp status after diuresis  JUANJO prev treated with CPAP 8 cm pressure  Adeq Oxygenation on RA    plan  diuresis  cardiac cath / reviewed data with Dr Farrar today  will need ct chest for eval  Cpap as tolerated  will get Home sleep test after wt loss at home after discharge  will also need PFT as outpt  wife at bedside informed regarding status today

## 2018-03-21 NOTE — DISCHARGE NOTE ADULT - HOSPITAL COURSE
69yo/M with PMH HTN, hyperlipidemia took himself off all the meds while started on "plant diet" presented for evaluation of SOB. He states that for the last couple of days he was getting worsening dyspnea on exertion and today in am he woke up as he could not breathe. Denies chest pain. NO fever, no recent sick contacts.   Patient had positive stress test 2 years ago during clearance for cystoscopy, was essentially asymptomatic and did not have cardiac cath. His last ECHO had EF 44%   3/21/18 S/P LHC and RHC  One Vessel coronary artery disease (LAD) .   Mildly reduced left ventricular systolic function with segmental wall   motion abnormalities. Estimated LV ejection fraction is 50 %.   Moderate to severe mitral valve regurgitation.  Mild pulmonary artery hypertension.   Elevated pulmonary capillary wedge pressure.  S/P CHRIS postcardiac cath which revealed severe MR. Pt will be transferred to [place] for possible CABG and valvular surgery 69yo/M with PMH HTN, hyperlipidemia took himself off all the meds while started on "plant diet" presented for evaluation of SOB. He states that for the last couple of days he was getting worsening dyspnea on exertion and today in am he woke up as he could not breathe. Denies chest pain. NO fever, no recent sick contacts.   Patient had positive stress test 2 years ago during clearance for cystoscopy, was essentially asymptomatic and did not have cardiac cath. His last ECHO had EF 44%   3/21/18 S/P LHC and RHC  One Vessel coronary artery disease (LAD) .   Mildly reduced left ventricular systolic function with segmental wall   motion abnormalities. Estimated LV ejection fraction is 50 %.   Moderate to severe mitral valve regurgitation.  Mild pulmonary artery hypertension.   Elevated pulmonary capillary wedge pressure.  S/P CHRIS postcardiac cath which revealed severe MR. Pt will be transferred to [place] for possible CABG and valvular surgery by Dr Maldonado

## 2018-03-25 LAB
CULTURE RESULTS: SIGNIFICANT CHANGE UP
CULTURE RESULTS: SIGNIFICANT CHANGE UP
SPECIMEN SOURCE: SIGNIFICANT CHANGE UP
SPECIMEN SOURCE: SIGNIFICANT CHANGE UP

## 2018-09-24 PROBLEM — Z00.00 ENCOUNTER FOR PREVENTIVE HEALTH EXAMINATION: Status: ACTIVE | Noted: 2018-09-24

## 2018-09-24 PROBLEM — E78.5 HYPERLIPIDEMIA, UNSPECIFIED: Chronic | Status: ACTIVE | Noted: 2018-03-20

## 2018-09-24 PROBLEM — I10 ESSENTIAL (PRIMARY) HYPERTENSION: Chronic | Status: ACTIVE | Noted: 2018-03-20

## 2018-10-18 ENCOUNTER — NON-APPOINTMENT (OUTPATIENT)
Age: 68
End: 2018-10-18

## 2018-10-18 ENCOUNTER — APPOINTMENT (OUTPATIENT)
Dept: ELECTROPHYSIOLOGY | Facility: CLINIC | Age: 68
End: 2018-10-18
Payer: MEDICARE

## 2018-10-18 VITALS
BODY MASS INDEX: 25.92 KG/M2 | HEART RATE: 81 BPM | SYSTOLIC BLOOD PRESSURE: 127 MMHG | DIASTOLIC BLOOD PRESSURE: 77 MMHG | HEIGHT: 69 IN | WEIGHT: 175 LBS

## 2018-10-18 DIAGNOSIS — Z80.9 FAMILY HISTORY OF MALIGNANT NEOPLASM, UNSPECIFIED: ICD-10-CM

## 2018-10-18 DIAGNOSIS — I47.2 VENTRICULAR TACHYCARDIA: ICD-10-CM

## 2018-10-18 DIAGNOSIS — Z86.39 PERSONAL HISTORY OF OTHER ENDOCRINE, NUTRITIONAL AND METABOLIC DISEASE: ICD-10-CM

## 2018-10-18 DIAGNOSIS — I10 ESSENTIAL (PRIMARY) HYPERTENSION: ICD-10-CM

## 2018-10-18 PROCEDURE — 99205 OFFICE O/P NEW HI 60 MIN: CPT

## 2018-10-18 PROCEDURE — 93000 ELECTROCARDIOGRAM COMPLETE: CPT

## 2018-10-18 RX ORDER — SIMVASTATIN 40 MG/1
40 TABLET, FILM COATED ORAL
Refills: 0 | Status: ACTIVE | COMMUNITY

## 2018-10-18 RX ORDER — EZETIMIBE 10 MG/1
10 TABLET ORAL
Refills: 0 | Status: ACTIVE | COMMUNITY

## 2018-10-18 RX ORDER — METOPROLOL TARTRATE 25 MG/1
25 TABLET, FILM COATED ORAL
Refills: 0 | Status: ACTIVE | COMMUNITY

## 2018-10-18 RX ORDER — POTASSIUM CHLORIDE 1.5 G/1
20 POWDER, FOR SOLUTION ORAL
Refills: 0 | Status: ACTIVE | COMMUNITY

## 2018-10-18 RX ORDER — TAMSULOSIN HYDROCHLORIDE 0.4 MG/1
0.4 CAPSULE ORAL
Refills: 0 | Status: ACTIVE | COMMUNITY

## 2018-10-18 RX ORDER — FUROSEMIDE 40 MG/1
40 TABLET ORAL
Refills: 0 | Status: ACTIVE | COMMUNITY

## 2018-10-18 RX ORDER — ASPIRIN 81 MG
81 TABLET, DELAYED RELEASE (ENTERIC COATED) ORAL
Refills: 0 | Status: ACTIVE | COMMUNITY

## 2018-10-18 RX ORDER — ACETAMINOPHEN 325 MG/1
325 TABLET ORAL
Refills: 0 | Status: ACTIVE | COMMUNITY

## 2018-10-18 RX ORDER — MOMETASONE FUROATE 1 MG/G
0.1 CREAM TOPICAL
Refills: 0 | Status: ACTIVE | COMMUNITY

## 2019-03-19 PROBLEM — Z80.9 FAMILY HISTORY OF MALIGNANT NEOPLASM: Status: ACTIVE | Noted: 2019-03-19

## 2019-03-19 PROBLEM — I10 BENIGN ESSENTIAL HYPERTENSION: Status: ACTIVE | Noted: 2019-03-19

## 2019-03-19 PROBLEM — Z86.39 HISTORY OF HYPERLIPIDEMIA: Status: RESOLVED | Noted: 2019-03-19 | Resolved: 2019-03-19

## 2019-03-19 NOTE — ASSESSMENT
[FreeTextEntry1] : This 69-year-old gentleman with a history of ischemic cardiomyopathy status post CABG and mitral valve repair who has an ejection fraction of 40% with nonsustained ventricular tachycardia on a Holter monitor. He meets that he MUSTT criteria for an electrophysiology study  for risk stratification of SCD. \par \par Discussed the risks, benefits, and alternatives to repeat study, possible ICD with the patient.

## 2019-03-19 NOTE — HISTORY OF PRESENT ILLNESS
[FreeTextEntry1] : This is a 68-year-old gentleman with a history of an ischemic dilated cardiomyopathy, who presents for risk stratification for sudden cardiac death. RANCHO KRAUSE  denies chest pain, chest pressure, shortness of breath, lightheadedness, dizziness, palpitations, syncope, presyncope, orthopnea, PND, or edema. \par \par MUGA 9/19/18 EF 40% HOLTER monitor 10/8/18 NSVT.

## 2019-03-19 NOTE — PHYSICAL EXAM
[Normal Appearance] : normal appearance [General Appearance - Well Developed] : well developed [General Appearance - Well Nourished] : well nourished [Well Groomed] : well groomed [No Deformities] : no deformities [Normal Conjunctiva] : the conjunctiva exhibited no abnormalities [General Appearance - In No Acute Distress] : no acute distress [Eyelids - No Xanthelasma] : the eyelids demonstrated no xanthelasmas [Normal Oral Mucosa] : normal oral mucosa [No Oral Pallor] : no oral pallor [No Oral Cyanosis] : no oral cyanosis [Normal Jugular Venous A Waves Present] : normal jugular venous A waves present [Normal Jugular Venous V Waves Present] : normal jugular venous V waves present [No Jugular Venous Loaiza A Waves] : no jugular venous loaiza A waves [Heart Rate And Rhythm] : heart rate and rhythm were normal [Heart Sounds] : normal S1 and S2 [Murmurs] : no murmurs present [Exaggerated Use Of Accessory Muscles For Inspiration] : no accessory muscle use [Respiration, Rhythm And Depth] : normal respiratory rhythm and effort [Auscultation Breath Sounds / Voice Sounds] : lungs were clear to auscultation bilaterally [Abdomen Soft] : soft [Abdomen Tenderness] : non-tender [Abdomen Mass (___ Cm)] : no abdominal mass palpated [Abnormal Walk] : normal gait [Nail Clubbing] : no clubbing of the fingernails [Gait - Sufficient For Exercise Testing] : the gait was sufficient for exercise testing [Cyanosis, Localized] : no localized cyanosis [Petechial Hemorrhages (___cm)] : no petechial hemorrhages [] : no rash [Skin Color & Pigmentation] : normal skin color and pigmentation [No Venous Stasis] : no venous stasis [Skin Lesions] : no skin lesions [No Skin Ulcers] : no skin ulcer [No Xanthoma] : no  xanthoma was observed [Affect] : the affect was normal [Mood] : the mood was normal [Oriented To Time, Place, And Person] : oriented to person, place, and time [No Anxiety] : not feeling anxious

## 2020-12-13 DIAGNOSIS — Z01.818 ENCOUNTER FOR OTHER PREPROCEDURAL EXAMINATION: ICD-10-CM

## 2020-12-14 ENCOUNTER — APPOINTMENT (OUTPATIENT)
Dept: DISASTER EMERGENCY | Facility: CLINIC | Age: 70
End: 2020-12-14

## 2021-11-01 NOTE — ASU PREOP CHECKLIST - ALLERGIES REVIEWED
Collateral obtained from: pt's daughter Casey Luna [phone]    Immediate Stressors & Time Episode Began: Daughter reported pt was here recently for three to four days and when she came home she was overwhelmed. Daughter reported pt was also not sleeping. Diagnosis/Hx of compliance with meds: Daughter reported depression and pt is compliant with meds. Tx Hx/Past hospitalizations: Daughter reported [facility]. Family hx of psychiatric issues: Daughter reported none. Substance Abuse: Daughter reported none. Pending Legal: Daughter reported none. Safety Issues (Weapons? Hx of attempts): Daughter reported no weapons in home and one past suicidal attempt by overdosing on meds. Support system/Medication Managed by: The importance of medication management and locking extra medication in a secured location was explained and reccommended to collateral. Daughter reported pt manages own meds. Who does the pt live with: Daughter reported pt lives with her mom.      Electronically signed by Cristina Martin, [id number] on 11/1/2021 at 9:16 AM done
